# Patient Record
Sex: MALE | Race: WHITE | NOT HISPANIC OR LATINO | Employment: FULL TIME | URBAN - METROPOLITAN AREA
[De-identification: names, ages, dates, MRNs, and addresses within clinical notes are randomized per-mention and may not be internally consistent; named-entity substitution may affect disease eponyms.]

---

## 2017-01-28 DIAGNOSIS — R03.0 BORDERLINE HYPERTENSION: ICD-10-CM

## 2017-01-30 RX ORDER — LISINOPRIL 5 MG/1
TABLET ORAL
Qty: 30 TABLET | Refills: 0 | Status: SHIPPED | OUTPATIENT
Start: 2017-01-30 | End: 2017-03-11 | Stop reason: SDUPTHER

## 2017-02-16 ENCOUNTER — OFFICE VISIT (OUTPATIENT)
Dept: FAMILY MEDICINE CLINIC | Facility: CLINIC | Age: 52
End: 2017-02-16

## 2017-02-16 VITALS
DIASTOLIC BLOOD PRESSURE: 74 MMHG | HEART RATE: 88 BPM | WEIGHT: 256 LBS | HEIGHT: 72 IN | OXYGEN SATURATION: 96 % | SYSTOLIC BLOOD PRESSURE: 146 MMHG | TEMPERATURE: 98.4 F | BODY MASS INDEX: 34.67 KG/M2 | RESPIRATION RATE: 16 BRPM

## 2017-02-16 DIAGNOSIS — R00.2 HEART PALPITATIONS: ICD-10-CM

## 2017-02-16 DIAGNOSIS — Z23 NEED FOR INFLUENZA VACCINATION: ICD-10-CM

## 2017-02-16 DIAGNOSIS — F41.9 ANXIETY: ICD-10-CM

## 2017-02-16 DIAGNOSIS — I10 ESSENTIAL HYPERTENSION: Primary | ICD-10-CM

## 2017-02-16 DIAGNOSIS — R53.82 CHRONIC FATIGUE: ICD-10-CM

## 2017-02-16 PROCEDURE — 90658 IIV3 VACCINE SPLT 0.5 ML IM: CPT | Performed by: PHYSICIAN ASSISTANT

## 2017-02-16 PROCEDURE — 93000 ELECTROCARDIOGRAM COMPLETE: CPT | Performed by: PHYSICIAN ASSISTANT

## 2017-02-16 PROCEDURE — 90471 IMMUNIZATION ADMIN: CPT | Performed by: PHYSICIAN ASSISTANT

## 2017-02-16 PROCEDURE — 99214 OFFICE O/P EST MOD 30 MIN: CPT | Performed by: PHYSICIAN ASSISTANT

## 2017-02-16 RX ORDER — CITALOPRAM 40 MG/1
40 TABLET ORAL DAILY
Qty: 30 TABLET | Refills: 3 | Status: SHIPPED | OUTPATIENT
Start: 2017-02-16 | End: 2017-05-15 | Stop reason: SDUPTHER

## 2017-02-16 NOTE — PROGRESS NOTES
Subjective   Wei Foster is a 51 y.o. male.   Chief Complaint   Patient presents with   • Hypertension   • Follow-up       History of Present Illness     Wei is a 51-year-old male who presents with hypertension management.  He has gained 2 pounds since June 22, 2016 office visit.  States he has been having some heart palpitations and  increase anxiety.  His workload has increased.  He works about 53 hours a week and has more responsibility with less workers.  He has been compliant with his citalopram but feels anxious.  He has noticed some heart palpitations and lightheadedness at times.  Wei has been compliant with his blood pressure medication.  Not for sure if his blood pressure has been elevated or not.  Wei has not been checking his blood pressure at home.  Denied any chest pain, shortness of air, or swelling of his ankles.  He has had an occasional headache.  His sleep has been slightly restless due to normal.  He has been taking over-the-counter melatonin to help him sleep.  Denied any suicidal or homicidal ideation.  No witness sleep apnea or snoring noted.  He drinks approximately one pot of coffee a day.  He is been trying to exercise at the [employer] couple times a week.  He has also been walking at work for about 20 minutes a day.  His appetite has been slightly healthy.  He has cut out soda.  Denied any vision changes. Wei had a normal stress test last summer.  He is non fasting at today's office visit.  Wei has had some fatigue issues.  He would like to have the flu shot at today's office visit.      The following portions of the patient's history were reviewed and updated as appropriate: allergies, current medications, past family history, past medical history, past social history and past surgical history.    Review of Systems   Constitutional: Positive for fatigue. Negative for chills and fever.   Eyes: Negative.    Respiratory: Negative.  Negative for cough, shortness of breath and wheezing.   "  Cardiovascular: Positive for palpitations. Negative for chest pain.   Gastrointestinal: Negative.  Negative for constipation, diarrhea, nausea and vomiting.   Endocrine: Negative.    Genitourinary: Negative.    Musculoskeletal: Negative.    Skin: Negative.    Allergic/Immunologic: Negative.    Neurological: Positive for headaches.   Hematological: Negative.    Psychiatric/Behavioral: Negative for sleep disturbance and suicidal ideas. The patient is nervous/anxious.    All other systems reviewed and are negative.    Vitals:    02/16/17 0802   BP: 146/74   BP Location: Right arm   Patient Position: Sitting   Cuff Size: Adult   Pulse: 88   Resp: 16   Temp: 98.4 °F (36.9 °C)   TempSrc: Oral   SpO2: 96%   Weight: 256 lb (116 kg)   Height: 72\" (182.9 cm)     BP Readings from Last 3 Encounters:   02/16/17 146/74   06/22/16 137/64   05/11/16 120/90     Wt Readings from Last 3 Encounters:   02/16/17 256 lb (116 kg)   06/22/16 254 lb (115 kg)   05/11/16 253 lb 9.6 oz (115 kg)       Body mass index is 34.72 kg/(m^2).    Allergies   Allergen Reactions   • Penicillins        Objective   Physical Exam   Constitutional: He is oriented to person, place, and time. Vital signs are normal. He appears well-developed and well-nourished.   HENT:   Head: Normocephalic and atraumatic.   Neck: Trachea normal and phonation normal. Neck supple. Carotid bruit is not present. No edema present. No thyroid mass and no thyromegaly present.   Cardiovascular: Normal rate, regular rhythm, S1 normal, S2 normal, normal heart sounds, intact distal pulses and normal pulses.    Pulmonary/Chest: Effort normal and breath sounds normal.   Abdominal: Soft. Normal appearance, normal aorta and bowel sounds are normal. There is no hepatomegaly. There is no tenderness.   Neurological: He is alert and oriented to person, place, and time. He has normal reflexes.   Skin: Skin is warm, dry and intact.   Psychiatric: He has a normal mood and affect. His speech is " normal and behavior is normal. Judgment and thought content normal. Cognition and memory are normal.         ECG 12 Lead  Date/Time: 2/16/2017 8:29 AM  Performed by: CARLOS LOZA  Authorized by: CARLOS LOZA   Comparison: compared with previous ECG   Comparison to previous ECG: Similar to EKG performed on May 11, 2016  Rhythm: sinus rhythm  Rate: normal  BPM: 77  Conduction: conduction normal  ST Segments: ST segments normal  T Waves: T waves normal  QRS axis: normal  Clinical impression: normal ECG  Comments: Indication:  Hypertension and heart Palpitations  P/RI:  116/156 ms  QRS:  80 ms  QTc:  412/466 ms            Assessment/Plan   Wei was seen today for hypertension and follow-up.    Diagnoses and all orders for this visit:    Essential hypertension  -     ECG 12 Lead  -     CBC & Differential  -     Comprehensive Metabolic Panel  -     Lipid Panel With LDL / HDL Ratio    Anxiety  -     CBC & Differential  -     citalopram (CeleXA) 40 MG tablet; Take 1 tablet by mouth Daily.    Heart palpitations  -     ECG 12 Lead  -     CBC & Differential  -     Thyroid Panel With TSH    Need for influenza vaccination  -     Flu Vaccine Greater Than or Equal To 2yo With Preservative IM    Chronic fatigue  -     Vitamin B12  -     Vitamin D 25 Hydroxy  -     Thyroid Panel With TSH      1. Stable hypertension with heart Palpitations: I have rechecked his blood pressure at today's office visit and got 100/62 in left arm and 100/70 in left arm.  Refilled his blood pressure medication is stable at this time.  I've asked him to return to office in one month for reevaluation.  I suspect his symptoms may be related to increased anxiety.  I have asked him to decrease his caffeine intake.  This may be causing some of the palpitations.  He had a normal EKG at today's office visit.  We will consider possible Holter monitor in future if lab work is normal.  Wei will have a CMP and Lipid profile collected at today's office  visit.  He will be notified of results when completed.  2.  Chronic anxiety: I suspect his symptoms are related to increased anxiety at work.  I will increase his citalopram to 40 mg a day.  I have asked Wei to return to office in one month for reevaluation.  3.  Chronic fatigue: Wei will have a CBC, CMP, vitamin B12, vitamin D level and a thyroid profile with TSH collected at today's office visit for further evaluation of his symptoms.  He'll be notified of test results when completed.  4.  Need for influenza vaccination: Wei has given written consent and will receive the flu immunization at today's office visit.      Dragon transcription disclaimer    Much of this encounter note is an electronic transcription/translation of spoken language to printed text.  The electronic translation of spoken language may permit erroneous, or at times, nonsensical words or phrases to be inadvertently transcribed.  Although I have reviewed the note for such errors, some may still exist.    Stephanie Martines PA-C  Family Practice

## 2017-02-17 ENCOUNTER — TELEPHONE (OUTPATIENT)
Dept: FAMILY MEDICINE CLINIC | Facility: CLINIC | Age: 52
End: 2017-02-17

## 2017-02-17 LAB
25(OH)D3+25(OH)D2 SERPL-MCNC: 20.2 NG/ML
ALBUMIN SERPL-MCNC: 4.4 G/DL (ref 3.5–5.2)
ALBUMIN/GLOB SERPL: 2.3 G/DL
ALP SERPL-CCNC: 70 U/L (ref 40–129)
ALT SERPL-CCNC: 64 U/L (ref 5–41)
AST SERPL-CCNC: 34 U/L (ref 5–40)
BASOPHILS # BLD AUTO: 0.06 10*3/MM3 (ref 0–0.2)
BASOPHILS NFR BLD AUTO: 0.9 % (ref 0–2)
BILIRUB SERPL-MCNC: 0.3 MG/DL (ref 0.2–1.2)
BUN SERPL-MCNC: 15 MG/DL (ref 6–20)
BUN/CREAT SERPL: 18.3 (ref 7–25)
CALCIUM SERPL-MCNC: 9 MG/DL (ref 8.6–10.5)
CHLORIDE SERPL-SCNC: 104 MMOL/L (ref 98–107)
CHOLEST SERPL-MCNC: 223 MG/DL (ref 0–200)
CO2 SERPL-SCNC: 25.3 MMOL/L (ref 22–29)
CREAT SERPL-MCNC: 0.82 MG/DL (ref 0.76–1.27)
EOSINOPHIL # BLD AUTO: 0.09 10*3/MM3 (ref 0.1–0.3)
EOSINOPHIL NFR BLD AUTO: 1.4 % (ref 0–4)
ERYTHROCYTE [DISTWIDTH] IN BLOOD BY AUTOMATED COUNT: 13.1 % (ref 11.5–14.5)
FT4I SERPL CALC-MCNC: 1.8 (ref 1.2–4.9)
GLOBULIN SER CALC-MCNC: 1.9 GM/DL
GLUCOSE SERPL-MCNC: 97 MG/DL (ref 65–99)
HCT VFR BLD AUTO: 45.7 % (ref 42–52)
HDLC SERPL-MCNC: 43 MG/DL (ref 40–60)
HGB BLD-MCNC: 14.8 G/DL (ref 14–18)
IMM GRANULOCYTES # BLD: 0.03 10*3/MM3 (ref 0–0.03)
IMM GRANULOCYTES NFR BLD: 0.5 % (ref 0–0.5)
LDLC SERPL CALC-MCNC: 151 MG/DL (ref 0–100)
LDLC/HDLC SERPL: 3.51 {RATIO}
LYMPHOCYTES # BLD AUTO: 1.45 10*3/MM3 (ref 0.6–4.8)
LYMPHOCYTES NFR BLD AUTO: 22.3 % (ref 20–45)
MCH RBC QN AUTO: 29.7 PG (ref 27–31)
MCHC RBC AUTO-ENTMCNC: 32.4 G/DL (ref 31–37)
MCV RBC AUTO: 91.8 FL (ref 80–94)
MONOCYTES # BLD AUTO: 0.57 10*3/MM3 (ref 0–1)
MONOCYTES NFR BLD AUTO: 8.8 % (ref 3–8)
NEUTROPHILS # BLD AUTO: 4.29 10*3/MM3 (ref 1.5–8.3)
NEUTROPHILS NFR BLD AUTO: 66.1 % (ref 45–70)
NRBC BLD AUTO-RTO: 0 /100 WBC (ref 0–0)
PLATELET # BLD AUTO: 221 10*3/MM3 (ref 140–500)
POTASSIUM SERPL-SCNC: 4.2 MMOL/L (ref 3.5–5.2)
PROT SERPL-MCNC: 6.3 G/DL (ref 6–8.5)
RBC # BLD AUTO: 4.98 10*6/MM3 (ref 4.7–6.1)
SODIUM SERPL-SCNC: 142 MMOL/L (ref 136–145)
T3RU NFR SERPL: 28 % (ref 24–39)
T4 SERPL-MCNC: 6.5 UG/DL (ref 4.5–12)
TRIGL SERPL-MCNC: 146 MG/DL (ref 0–150)
TSH SERPL DL<=0.005 MIU/L-ACNC: 1.98 UIU/ML (ref 0.45–4.5)
VIT B12 SERPL-MCNC: 338 PG/ML (ref 211–946)
VLDLC SERPL CALC-MCNC: 29.2 MG/DL (ref 8–32)
WBC # BLD AUTO: 6.49 10*3/MM3 (ref 4.8–10.8)

## 2017-02-17 NOTE — TELEPHONE ENCOUNTER
----- Message from Stephanie Martines PA-C sent at 2/17/2017  7:06 AM EST -----  1.  Please notify patient vitamin B-12 was normal.  2.  CBC was normal.  He is not anemic.  3.  Fasting blood sugar was normal at 97.  4.  Liver function test have improved from 9 months ago.  5.  Cholesterol was 223, triglycerides 146, HDL 43 and LDL was 151.  His cholesterol readings were slightly elevated.  Please decrease fatty food intake and increase physical activity.  Please start over-the-counter omega-3 fatty acid 2 tablets a day.  Plan to repeat CMP and a lipid profile in 6 months.

## 2017-03-11 DIAGNOSIS — R03.0 BORDERLINE HYPERTENSION: ICD-10-CM

## 2017-03-13 RX ORDER — LISINOPRIL 5 MG/1
TABLET ORAL
Qty: 30 TABLET | Refills: 0 | Status: SHIPPED | OUTPATIENT
Start: 2017-03-13 | End: 2017-04-21 | Stop reason: SDUPTHER

## 2017-04-21 DIAGNOSIS — R03.0 BORDERLINE HYPERTENSION: ICD-10-CM

## 2017-04-21 RX ORDER — LISINOPRIL 5 MG/1
TABLET ORAL
Qty: 30 TABLET | Refills: 0 | Status: SHIPPED | OUTPATIENT
Start: 2017-04-21 | End: 2017-06-12 | Stop reason: SDUPTHER

## 2017-05-15 DIAGNOSIS — F41.9 ANXIETY: ICD-10-CM

## 2017-05-15 RX ORDER — CITALOPRAM 40 MG/1
40 TABLET ORAL DAILY
Qty: 30 TABLET | Refills: 3 | Status: SHIPPED | OUTPATIENT
Start: 2017-05-15 | End: 2017-05-25 | Stop reason: SDUPTHER

## 2017-05-25 ENCOUNTER — OFFICE VISIT (OUTPATIENT)
Dept: FAMILY MEDICINE CLINIC | Facility: CLINIC | Age: 52
End: 2017-05-25

## 2017-05-25 VITALS
RESPIRATION RATE: 20 BRPM | HEIGHT: 72 IN | DIASTOLIC BLOOD PRESSURE: 68 MMHG | HEART RATE: 91 BPM | OXYGEN SATURATION: 96 % | BODY MASS INDEX: 35.35 KG/M2 | SYSTOLIC BLOOD PRESSURE: 132 MMHG | WEIGHT: 261 LBS

## 2017-05-25 DIAGNOSIS — F41.9 ANXIETY: ICD-10-CM

## 2017-05-25 DIAGNOSIS — R05.9 COUGH: Primary | ICD-10-CM

## 2017-05-25 PROCEDURE — 99213 OFFICE O/P EST LOW 20 MIN: CPT | Performed by: NURSE PRACTITIONER

## 2017-05-25 RX ORDER — BENZONATATE 100 MG/1
200 CAPSULE ORAL 3 TIMES DAILY PRN
Qty: 60 CAPSULE | Refills: 0 | Status: SHIPPED | OUTPATIENT
Start: 2017-05-25 | End: 2017-12-04

## 2017-05-25 RX ORDER — CITALOPRAM 40 MG/1
40 TABLET ORAL DAILY
Qty: 30 TABLET | Refills: 3 | Status: SHIPPED | OUTPATIENT
Start: 2017-05-25 | End: 2018-06-07 | Stop reason: SDUPTHER

## 2017-06-12 DIAGNOSIS — R03.0 BORDERLINE HYPERTENSION: ICD-10-CM

## 2017-06-12 RX ORDER — LISINOPRIL 5 MG/1
TABLET ORAL
Qty: 30 TABLET | Refills: 0 | Status: SHIPPED | OUTPATIENT
Start: 2017-06-12 | End: 2017-07-28 | Stop reason: SINTOL

## 2017-07-28 ENCOUNTER — OFFICE VISIT (OUTPATIENT)
Dept: FAMILY MEDICINE CLINIC | Facility: CLINIC | Age: 52
End: 2017-07-28

## 2017-07-28 ENCOUNTER — HOSPITAL ENCOUNTER (OUTPATIENT)
Dept: GENERAL RADIOLOGY | Facility: HOSPITAL | Age: 52
Discharge: HOME | End: 2017-07-28
Admitting: PHYSICIAN ASSISTANT

## 2017-07-28 VITALS
HEART RATE: 83 BPM | TEMPERATURE: 98.2 F | WEIGHT: 257 LBS | DIASTOLIC BLOOD PRESSURE: 80 MMHG | HEIGHT: 72 IN | RESPIRATION RATE: 16 BRPM | SYSTOLIC BLOOD PRESSURE: 130 MMHG | BODY MASS INDEX: 34.81 KG/M2 | OXYGEN SATURATION: 98 %

## 2017-07-28 DIAGNOSIS — R06.2 WHEEZING: ICD-10-CM

## 2017-07-28 DIAGNOSIS — I10 ESSENTIAL HYPERTENSION: ICD-10-CM

## 2017-07-28 DIAGNOSIS — R05.9 COUGH: ICD-10-CM

## 2017-07-28 DIAGNOSIS — I10 ESSENTIAL HYPERTENSION: Primary | ICD-10-CM

## 2017-07-28 PROCEDURE — 71020 HC CHEST PA AND LATERAL: CPT

## 2017-07-28 PROCEDURE — 99213 OFFICE O/P EST LOW 20 MIN: CPT | Performed by: PHYSICIAN ASSISTANT

## 2017-07-28 RX ORDER — VALSARTAN 80 MG/1
80 TABLET ORAL DAILY
Qty: 30 TABLET | Refills: 3 | Status: SHIPPED | OUTPATIENT
Start: 2017-07-28 | End: 2017-12-04

## 2017-07-28 NOTE — PROGRESS NOTES
Subjective   Wei Foster is a 52 y.o. male.   Chief Complaint   Patient presents with   • Cough       History of Present Illness     Wei is a 52 year old male who presents with a dry cough for the past months.  He has been on Lisinopril for several years without coughing.  He has had some chest congestion,wheezing, heartburn,reflux and slight shortness of air with coughing.  He was treated for allergies in May but had no improvement with the cough.  Appetite and sleep has been normal.  Wei has been exercising regularly without cough/wheezing/SOA.  He denied any chest pain, fevers, chills, upper respiratory symptoms or swelling of ankles.    The following portions of the patient's history were reviewed and updated as appropriate: allergies, current medications, past family history, past medical history, past social history and past surgical history.    Review of Systems   Constitutional: Negative.    HENT: Negative.    Eyes: Negative.    Respiratory: Positive for cough and shortness of breath.    Cardiovascular: Negative.  Negative for palpitations and leg swelling.   Gastrointestinal: Negative.    Endocrine: Negative.    Genitourinary: Negative.    Musculoskeletal: Negative.    Skin: Negative.    Allergic/Immunologic: Negative.    Neurological: Negative.  Negative for dizziness, light-headedness and headaches.   Hematological: Negative.    Psychiatric/Behavioral: Negative.    All other systems reviewed and are negative.    Vitals:    07/28/17 1404   BP: 130/80   Pulse:    Resp:    Temp:    SpO2:        Wt Readings from Last 3 Encounters:   07/28/17 257 lb (117 kg)   05/25/17 261 lb (118 kg)   02/16/17 256 lb (116 kg)     SpO2 Readings from Last 3 Encounters:   07/28/17 98%   05/25/17 96%   02/16/17 96%     BP Readings from Last 3 Encounters:   07/28/17 130/80   05/25/17 132/68   02/16/17 146/74     Body mass index is 34.86 kg/(m^2).  Allergies   Allergen Reactions   • Penicillins        Objective   Physical Exam    Constitutional: He is oriented to person, place, and time. Vital signs are normal. He appears well-developed and well-nourished.   HENT:   Head: Normocephalic and atraumatic.   Right Ear: Hearing, tympanic membrane, external ear and ear canal normal.   Left Ear: Hearing, tympanic membrane, external ear and ear canal normal.   Nose: Nose normal. Right sinus exhibits no maxillary sinus tenderness and no frontal sinus tenderness. Left sinus exhibits no maxillary sinus tenderness and no frontal sinus tenderness.   Mouth/Throat: Uvula is midline, oropharynx is clear and moist and mucous membranes are normal.   Eyes: Conjunctivae and lids are normal.   Neck: Trachea normal and phonation normal. Neck supple. Carotid bruit is not present. Edema present. No thyroid mass and no thyromegaly present.   Cardiovascular: Normal rate, regular rhythm, S1 normal, S2 normal, normal heart sounds, intact distal pulses and normal pulses.    Pulmonary/Chest: Effort normal and breath sounds normal.   Abdominal: Soft. Normal appearance, normal aorta and bowel sounds are normal. There is no hepatomegaly. There is no tenderness.   Lymphadenopathy:     He has no cervical adenopathy.   Neurological: He is alert and oriented to person, place, and time. He has normal reflexes.   Skin: Skin is warm, dry and intact.   Psychiatric: He has a normal mood and affect. His speech is normal and behavior is normal. Judgment and thought content normal. Cognition and memory are normal.       Assessment/Plan   Wei was seen today for cough.    Diagnoses and all orders for this visit:    Essential hypertension  -     XR Chest PA & Lateral; Future    Cough  -     XR Chest PA & Lateral; Future    Wheezing  -     XR Chest PA & Lateral; Future      1. Stable Hypertension: I have rechecked his blood pressure at today's office visit and got 130/78.  He has been taken as well as lisinopril for several years without difficulty.  If his chest x-rays normal will  consider stopping the lisinopril to see if his cough improves.  We'll consider possible alternative medications if warranted.  2.  New cough and wheezing: Wei has been having coughing and wheezing off and on for the past 6 weeks.  He was treated for a viral infection in May without improvement of his symptoms.  He will have a chest x-ray at the [facility] today for further evaluation of his symptoms.  I'll consider stopping the lisinopril and trying alternative medications if normal.        Dragon transcription disclaimer    Much of this encounter note is an electronic transcription/translation of spoken language to printed text.  The electronic translation of spoken language may permit erroneous, or at times, nonsensical words or phrases to be inadvertently transcribed.  Although I have reviewed the note for such errors, some may still exist.    Stephanie Martines PA-C  Family Practice

## 2017-07-31 ENCOUNTER — TELEPHONE (OUTPATIENT)
Dept: FAMILY MEDICINE CLINIC | Facility: CLINIC | Age: 52
End: 2017-07-31

## 2017-07-31 NOTE — TELEPHONE ENCOUNTER
----- Message from Stephanie Martines PA-C sent at 7/28/2017  4:46 PM EDT -----  Notify patient that chest x-ray was normal.  Plan to stop the lisinopril medication.  I will sent to pharmacy generic Diovan in its place.  Please take this once a day.  He should have resolution of cough if it's related to the lisinopril medication.  If not we'll send to pulmonology.  After starting new blood pressure medication please stop by office in 2 weeks for blood pressure check.

## 2017-10-20 DIAGNOSIS — E78.00 HYPERCHOLESTEREMIA: Primary | ICD-10-CM

## 2017-11-02 LAB
ALBUMIN SERPL-MCNC: 4.5 G/DL (ref 3.5–5.2)
ALBUMIN/GLOB SERPL: 1.9 G/DL
ALP SERPL-CCNC: 78 U/L (ref 40–129)
ALT SERPL-CCNC: 142 U/L (ref 5–41)
AST SERPL-CCNC: 66 U/L (ref 5–40)
BILIRUB SERPL-MCNC: 0.5 MG/DL (ref 0.2–1.2)
BUN SERPL-MCNC: 14 MG/DL (ref 6–20)
BUN/CREAT SERPL: 16.3 (ref 7–25)
CALCIUM SERPL-MCNC: 9.2 MG/DL (ref 8.6–10.5)
CHLORIDE SERPL-SCNC: 102 MMOL/L (ref 98–107)
CHOLEST SERPL-MCNC: 219 MG/DL (ref 0–200)
CO2 SERPL-SCNC: 28.3 MMOL/L (ref 22–29)
CREAT SERPL-MCNC: 0.86 MG/DL (ref 0.76–1.27)
GFR SERPLBLD CREATININE-BSD FMLA CKD-EPI: 114 ML/MIN/1.73
GFR SERPLBLD CREATININE-BSD FMLA CKD-EPI: 94 ML/MIN/1.73
GLOBULIN SER CALC-MCNC: 2.4 GM/DL
GLUCOSE SERPL-MCNC: 93 MG/DL (ref 65–99)
HDLC SERPL-MCNC: 38 MG/DL (ref 40–60)
LDLC SERPL CALC-MCNC: 147 MG/DL (ref 0–100)
LDLC/HDLC SERPL: 3.87 {RATIO}
POTASSIUM SERPL-SCNC: 4.1 MMOL/L (ref 3.5–5.2)
PROT SERPL-MCNC: 6.9 G/DL (ref 6–8.5)
SODIUM SERPL-SCNC: 142 MMOL/L (ref 136–145)
TRIGL SERPL-MCNC: 170 MG/DL (ref 0–150)
VLDLC SERPL CALC-MCNC: 34 MG/DL (ref 8–32)

## 2017-11-15 LAB
GGT SERPL-CCNC: 62 U/L (ref 8–61)
HCV AB S/CO SERPL IA: <0.1 S/CO RATIO (ref 0–0.9)
WRITTEN AUTHORIZATION: NORMAL

## 2017-11-16 ENCOUNTER — TELEPHONE (OUTPATIENT)
Dept: FAMILY MEDICINE CLINIC | Facility: CLINIC | Age: 52
End: 2017-11-16

## 2017-11-16 NOTE — TELEPHONE ENCOUNTER
----- Message from Stephanie Martines PA-C sent at 11/15/2017 11:36 AM EST -----  Have patient schedule an appointment to discuss lab work.

## 2017-12-04 ENCOUNTER — OFFICE VISIT (OUTPATIENT)
Dept: FAMILY MEDICINE CLINIC | Facility: CLINIC | Age: 52
End: 2017-12-04

## 2017-12-04 VITALS
RESPIRATION RATE: 16 BRPM | TEMPERATURE: 98.4 F | OXYGEN SATURATION: 95 % | HEART RATE: 88 BPM | BODY MASS INDEX: 35.21 KG/M2 | WEIGHT: 260 LBS | DIASTOLIC BLOOD PRESSURE: 92 MMHG | HEIGHT: 72 IN | SYSTOLIC BLOOD PRESSURE: 138 MMHG

## 2017-12-04 DIAGNOSIS — R94.5 ABNORMAL LIVER FUNCTION: ICD-10-CM

## 2017-12-04 DIAGNOSIS — E78.2 MIXED HYPERLIPIDEMIA: ICD-10-CM

## 2017-12-04 DIAGNOSIS — I10 ESSENTIAL HYPERTENSION: Primary | ICD-10-CM

## 2017-12-04 DIAGNOSIS — F41.9 ANXIETY: ICD-10-CM

## 2017-12-04 DIAGNOSIS — IMO0001 COLD: ICD-10-CM

## 2017-12-04 PROCEDURE — 99214 OFFICE O/P EST MOD 30 MIN: CPT | Performed by: PHYSICIAN ASSISTANT

## 2017-12-04 RX ORDER — LISINOPRIL 40 MG/1
40 TABLET ORAL DAILY
COMMUNITY
End: 2017-12-04 | Stop reason: SDUPTHER

## 2017-12-04 RX ORDER — BENZONATATE 100 MG/1
100 CAPSULE ORAL 3 TIMES DAILY PRN
Qty: 30 CAPSULE | Refills: 0 | Status: SHIPPED | OUTPATIENT
Start: 2017-12-04

## 2017-12-04 RX ORDER — LISINOPRIL 40 MG/1
40 TABLET ORAL DAILY
Qty: 30 TABLET | Refills: 6 | Status: SHIPPED | OUTPATIENT
Start: 2017-12-04 | End: 2018-07-23 | Stop reason: RX

## 2017-12-04 NOTE — PROGRESS NOTES
Subjective   Wei Foster is a 52 y.o. male.   Chief Complaint   Patient presents with   • Hypertension     management   • Cough       History of Present Illness     Wei is a 52-year-old male who presents for hypertension management.  He has gained 3 pounds since July 28, 2017.  He is also here to discuss his lab work. He just with a head cold that started last night.  He was working in his yard over the weekend. He has had some sinus pressure,dry cough,stuffy nose and one episode of diarrhea today.  Wei took some OTC naproxen which has helped.  He didn't go to work today.  Wei denied any foreign travel, tattoos, piercing,blood transfusion or IV drug usage.  He has city water.  Bowel movements have been daily without dark black or wei colored stools.  Denied any chest pain, shortness of air, dizziness, night sweats, melena pain.  He has Sent home that are well.  No family members are sick either.  Sleep and appetite have been normal.    The following portions of the patient's history were reviewed and updated as appropriate: allergies, current medications, past family history, past medical history, past social history and past surgical history.    Review of Systems   Constitutional: Negative.  Negative for chills, fatigue and fever.   HENT: Positive for sinus pressure. Negative for congestion, ear pain, postnasal drip, rhinorrhea, sinus pain, sore throat and voice change.    Eyes: Negative.    Respiratory: Positive for cough. Negative for shortness of breath and wheezing.    Cardiovascular: Negative.  Negative for chest pain, palpitations and leg swelling.   Gastrointestinal: Positive for diarrhea (once). Negative for abdominal pain, constipation and vomiting.   Endocrine: Negative.    Genitourinary: Negative.    Musculoskeletal: Negative.    Skin: Negative.    Allergic/Immunologic: Negative.    Neurological: Negative.  Negative for headaches.   Hematological: Negative.    Psychiatric/Behavioral: Negative.   "Negative for sleep disturbance and suicidal ideas.   All other systems reviewed and are negative.    Vitals:    12/04/17 1601   BP: 138/92   BP Location: Left arm   Patient Position: Sitting   Cuff Size: Large Adult   Pulse: 88   Resp: 16   Temp: 98.4 °F (36.9 °C)   TempSrc: Oral   SpO2: 95%   Weight: 260 lb (118 kg)   Height: 72\" (182.9 cm)       Wt Readings from Last 3 Encounters:   12/04/17 260 lb (118 kg)   07/28/17 257 lb (117 kg)   05/25/17 261 lb (118 kg)       BP Readings from Last 3 Encounters:   12/04/17 138/92   07/28/17 130/80   05/25/17 132/68     Body mass index is 35.26 kg/(m^2).  Allergies   Allergen Reactions   • Penicillins        Objective   Physical Exam   Constitutional: He is oriented to person, place, and time. Vital signs are normal. He appears well-developed and well-nourished.   HENT:   Head: Normocephalic and atraumatic.   Right Ear: Hearing, tympanic membrane, external ear and ear canal normal.   Left Ear: Hearing, tympanic membrane, external ear and ear canal normal.   Nose: Nose normal. Right sinus exhibits no maxillary sinus tenderness and no frontal sinus tenderness. Left sinus exhibits no maxillary sinus tenderness and no frontal sinus tenderness.   Mouth/Throat: Uvula is midline and mucous membranes are normal.   Slight post nasal drip   Eyes: Conjunctivae, EOM and lids are normal. Pupils are equal, round, and reactive to light. No scleral icterus.   Neck: Trachea normal and phonation normal. Neck supple. Carotid bruit is not present. No edema present. No thyromegaly present.   Cardiovascular: Normal rate, regular rhythm, S1 normal, S2 normal, normal heart sounds, intact distal pulses and normal pulses.    Pulmonary/Chest: Effort normal and breath sounds normal.   Abdominal: Soft. Normal appearance, normal aorta and bowel sounds are normal. There is no hepatosplenomegaly, splenomegaly or hepatomegaly. There is no tenderness.   Lymphadenopathy:     He has no cervical adenopathy. "   Neurological: He is alert and oriented to person, place, and time. He has normal reflexes.   Skin: Skin is warm, dry and intact.   Psychiatric: He has a normal mood and affect. His speech is normal and behavior is normal. Judgment and thought content normal. Cognition and memory are normal.       Assessment/Plan   Wei was seen today for hypertension and cough.    Diagnoses and all orders for this visit:    Essential hypertension  -     lisinopril (PRINIVIL,ZESTRIL) 40 MG tablet; Take 1 tablet by mouth Daily.    Abnormal liver function  -     Hepatic Function Panel    Cold  -     benzonatate (TESSALON) 100 MG capsule; Take 1 capsule by mouth 3 (Three) Times a Day As Needed for Cough.    Anxiety    Mixed hyperlipidemia      1. Chronic and Stable hypertension: I have rechecked Wei's blood pressure at today's office visit and got 1:30/90 in left arm.  He has not had his blood pressure medication today.  I've instructed Wei to take his blood pressure medicine when he gets home.  He will schedule follow up appointment in 6 months for reevaluation.  2.  New abnormal liver function test: Wei's ALT and GGT were elevated.  He will have repeat hepatic function tests blood work at today's office visit.  He'll be notified of test results when completed.  3.  New hyperlipidemia: I have reviewed Wei's blood work with him at today's office visit.  His cholesterol and triglycerides were slightly elevated.  Asked him to decrease his carbohydrates and fatty food in diet.  Have encouraged him to increase his physical activity as well.  Wei will start over-the-counter Fish oil 2 capsules daily.  We'll hold any statin medication for now due to his elevated liver function test.  Plan to repeat his lipid profile in 3 months.  4.  New cold: Wei has a common cold.  I have prescribed Tessalon Perles to pharmacy for his cough.  He will return to office if no improvement or worsening symptoms.  5.  Chronic and stable anxiety: Doing  well with the Celexa medication.  No refills are required at this time.          No visits with results within 2 Week(s) from this visit.  Latest known visit with results is:    Orders Only on 10/20/2017   Component Date Value Ref Range Status   • Glucose 11/02/2017 93  65 - 99 mg/dL Final   • BUN 11/02/2017 14  6 - 20 mg/dL Final   • Creatinine 11/02/2017 0.86  0.76 - 1.27 mg/dL Final   • eGFR Non  Am 11/02/2017 94  >60 mL/min/1.73 Final   • eGFR African Am 11/02/2017 114  >60 mL/min/1.73 Final   • BUN/Creatinine Ratio 11/02/2017 16.3  7.0 - 25.0 Final   • Sodium 11/02/2017 142  136 - 145 mmol/L Final   • Potassium 11/02/2017 4.1  3.5 - 5.2 mmol/L Final   • Chloride 11/02/2017 102  98 - 107 mmol/L Final   • Total CO2 11/02/2017 28.3  22.0 - 29.0 mmol/L Final   • Calcium 11/02/2017 9.2  8.6 - 10.5 mg/dL Final   • Total Protein 11/02/2017 6.9  6.0 - 8.5 g/dL Final   • Albumin 11/02/2017 4.50  3.50 - 5.20 g/dL Final   • Globulin 11/02/2017 2.4  gm/dL Final   • A/G Ratio 11/02/2017 1.9  g/dL Final   • Total Bilirubin 11/02/2017 0.5  0.2 - 1.2 mg/dL Final   • Alkaline Phosphatase 11/02/2017 78  40 - 129 U/L Final   • AST (SGOT) 11/02/2017 66* 5 - 40 U/L Final   • ALT (SGPT) 11/02/2017 142* 5 - 41 U/L Final   • Total Cholesterol 11/02/2017 219* 0 - 200 mg/dL Final   • Triglycerides 11/02/2017 170* 0 - 150 mg/dL Final   • HDL Cholesterol 11/02/2017 38* 40 - 60 mg/dL Final   • VLDL Cholesterol 11/02/2017 34* 8 - 32 mg/dL Final   • LDL Cholesterol  11/02/2017 147* 0 - 100 mg/dL Final   • LDL/HDL Ratio 11/02/2017 3.87   Final   • Hep C Virus Ab 11/02/2017 <0.1  0.0 - 0.9 s/co ratio Final    Comment:                                   Negative:     < 0.8                               Indeterminate: 0.8 - 0.9                                    Positive:     > 0.9   The CDC recommends that a positive HCV antibody result   be followed up with a HCV Nucleic Acid Amplification   test ([id number]).     • GGT 11/02/2017 62* 8 - 61  U/L Final   • Written Authorization 11/02/2017 Comment   Final    Comment: Written Authorization Received.  Authorization received from [employer] L[employer] 11-  Logged by Roma Katz transcription disclaimer    Much of this encounter note is an electronic transcription/translation of spoken language to printed text.  The electronic translation of spoken language may permit erroneous, or at times, nonsensical words or phrases to be inadvertently transcribed.  Although I have reviewed the note for such errors, some may still exist.    Stephanie Martines PA-C  Family Practice

## 2017-12-05 LAB
ALBUMIN SERPL-MCNC: 4.4 G/DL (ref 3.5–5.2)
ALP SERPL-CCNC: 76 U/L (ref 40–129)
ALT SERPL-CCNC: 143 U/L (ref 5–41)
AST SERPL-CCNC: 57 U/L (ref 5–40)
BILIRUB DIRECT SERPL-MCNC: <0.2 MG/DL (ref 0.2–0.3)
BILIRUB SERPL-MCNC: 0.2 MG/DL (ref 0.2–1.2)
PROT SERPL-MCNC: 6.7 G/DL (ref 6–8.5)

## 2017-12-07 ENCOUNTER — TELEPHONE (OUTPATIENT)
Dept: FAMILY MEDICINE CLINIC | Facility: CLINIC | Age: 52
End: 2017-12-07

## 2017-12-07 DIAGNOSIS — R79.89 ABNORMAL LIVER FUNCTION TEST: Primary | ICD-10-CM

## 2017-12-07 LAB
HAV IGM SERPL QL IA: NEGATIVE
HBV CORE IGM SERPL QL IA: NEGATIVE
HBV SURFACE AG SERPL QL IA: NEGATIVE
HCV AB S/CO SERPL IA: <0.1 S/CO RATIO (ref 0–0.9)
WRITTEN AUTHORIZATION: NORMAL

## 2017-12-07 NOTE — TELEPHONE ENCOUNTER
----- Message from Stephanie Martines PA-C sent at 12/7/2017  7:05 AM EST -----  Notify patient that his AST was 57 and ALT was 143.  This is still elevated.  His hepatitis A and hepatitis B testing were negative.  Notify patient that I have placed an order for ultrasound of liver for further evaluation of his persistent elevated liver function test.

## 2017-12-14 ENCOUNTER — HOSPITAL ENCOUNTER (OUTPATIENT)
Dept: ULTRASOUND IMAGING | Facility: HOSPITAL | Age: 52
Discharge: HOME | End: 2017-12-14
Admitting: PHYSICIAN ASSISTANT

## 2017-12-14 DIAGNOSIS — R79.89 ABNORMAL LIVER FUNCTION TEST: ICD-10-CM

## 2017-12-14 PROCEDURE — 76705 ECHO EXAM OF ABDOMEN: CPT

## 2017-12-15 ENCOUNTER — TELEPHONE (OUTPATIENT)
Dept: FAMILY MEDICINE CLINIC | Facility: CLINIC | Age: 52
End: 2017-12-15

## 2017-12-15 DIAGNOSIS — R94.5 ABNORMAL LIVER FUNCTION: Primary | ICD-10-CM

## 2017-12-15 DIAGNOSIS — K76.0 HEPATIC STEATOSIS: ICD-10-CM

## 2017-12-15 NOTE — TELEPHONE ENCOUNTER
----- Message from Stephanie Martines PA-C sent at 12/15/2017  6:55 AM EST -----  Please notify patient that his ultrasound of liver showed a fatty liver.  I will schedule a referral to GI specialist for further evaluation of his persistent elevated liver function tests with his fatty liver.

## 2017-12-19 ENCOUNTER — OFFICE VISIT (OUTPATIENT)
Dept: GASTROENTEROLOGY | Facility: CLINIC | Age: 52
End: 2017-12-19

## 2017-12-19 VITALS
DIASTOLIC BLOOD PRESSURE: 84 MMHG | TEMPERATURE: 98.2 F | SYSTOLIC BLOOD PRESSURE: 136 MMHG | HEIGHT: 72 IN | WEIGHT: 260.2 LBS | BODY MASS INDEX: 35.24 KG/M2

## 2017-12-19 DIAGNOSIS — K76.0 FATTY LIVER: Primary | ICD-10-CM

## 2017-12-19 PROCEDURE — 99203 OFFICE O/P NEW LOW 30 MIN: CPT | Performed by: INTERNAL MEDICINE

## 2017-12-19 RX ORDER — MULTIPLE VITAMINS W/ MINERALS TAB 9MG-400MCG
1 TAB ORAL DAILY
COMMUNITY

## 2017-12-19 NOTE — PROGRESS NOTES
Chief Complaint   Patient presents with   • ABN LFT's     Subjective      HPI     Wei Foster is a 52 y.o. male who presents for evaluation of elevated liver enzymes.  Elevation dates back to at least early last year. LFTs have been progressively increasing (normal TB and ALP).  Pt reports he is currently heaviest he has been.  He is working on dietary modification and has plans to increase exercise.  He reports insignificant EtOh consumption.  No family hx of liver diseases.  Prior workup included negative PHILL and acute hepatitis panel.  U/S shows hepatic steatosis.  He has elevated lipids but is not currently on therapy.  He denies OTC herbal supplements or ilicits.  He has not yet had screening colonoscopy - was scheduled last year but did not follow through with this.        Past Medical History:   Diagnosis Date   • Abnormal LFTs    • Anxiety    • Chronic lower back pain    • Hypertension        Current Outpatient Prescriptions:   •  cholecalciferol (VITAMIN D3) 400 UNITS tablet, Take 400 Units by mouth daily., Disp: , Rfl:   •  citalopram (CeleXA) 40 MG tablet, Take 1 tablet by mouth Daily., Disp: 30 tablet, Rfl: 3  •  lisinopril (PRINIVIL,ZESTRIL) 40 MG tablet, Take 1 tablet by mouth Daily., Disp: 30 tablet, Rfl: 6  •  Multiple Vitamins-Minerals (MULTIVITAMIN WITH MINERALS) tablet tablet, Take 1 tablet by mouth Daily., Disp: , Rfl:   •  naproxen (NAPROSYN) 500 MG tablet, Take 500 mg by mouth 2 (two) times a day with meals., Disp: , Rfl:   •  benzonatate (TESSALON) 100 MG capsule, Take 1 capsule by mouth 3 (Three) Times a Day As Needed for Cough., Disp: 30 capsule, Rfl: 0     Allergies   Allergen Reactions   • Penicillins      Social History     Social History   • Marital status:      Spouse name: N/A   • Number of children: N/A   • Years of education: N/A     Occupational History   • Not on file.     Social History Main Topics   • Smoking status: Never Smoker   • Smokeless tobacco: Never Used   •  Alcohol use Yes      Comment: 2-3 beers or 1 wine monthly   • Drug use: No   • Sexual activity: Not on file     Other Topics Concern   • Not on file     Social History Narrative     Family History   Problem Relation Age of Onset   • Rheum arthritis Mother    • Bipolar disorder Mother    • Glaucoma Mother    • Pancreatitis Mother    • Heart disease Father    • Hypertension Father    • Stroke Father    • Glaucoma Father    • Hypertension Brother      Review of Systems   Constitutional: Negative.    Respiratory: Negative.    Cardiovascular: Negative.    Gastrointestinal: Negative.    All other systems reviewed and are negative.    Objective   Vitals:    12/19/17 1413   BP: 136/84   Temp: 98.2 °F (36.8 °C)     Physical Exam   Constitutional: He is oriented to person, place, and time. He appears well-developed and well-nourished.   HENT:   Head: Normocephalic and atraumatic.   Mouth/Throat: Oropharynx is clear and moist.   Eyes: Conjunctivae are normal. No scleral icterus.   Neck: Normal range of motion. Neck supple. No thyromegaly present.   Cardiovascular: Normal rate and regular rhythm.  Exam reveals no friction rub.    No murmur heard.  Pulmonary/Chest: Effort normal and breath sounds normal. No respiratory distress. He has no wheezes. He has no rales. He exhibits no tenderness.   Abdominal: Soft. Bowel sounds are normal. He exhibits no distension and no mass. There is no tenderness. There is no rebound and no guarding. No hernia.   Obese  Diastasis rectus   Musculoskeletal: He exhibits no edema.   Lymphadenopathy:     He has no cervical adenopathy.     He has no axillary adenopathy.   Neurological: He is alert and oriented to person, place, and time.   Skin: Skin is warm and dry. No rash noted.   Psychiatric: He has a normal mood and affect. His behavior is normal. Judgment and thought content normal.   Vitals reviewed.       Assessment/Plan   Assessment:     1. Fatty liver      Plan:   Will arrange to complete  serologic workup today  Check CUEVAS Fibrosure    Long discussion with patient about importance of dietary modification and regular exercise with goal of weight reduction for treatment of his presumed CUEVAS.   He should be considered for lipid lower agent in the future if he does not have improvement in his LDL.  May consider trial of Vitamin E if advanced fibrosis suggested on Fibrosure.    We will discuss screening colonoscopy at next visit.  He had this scheduled last year with another provider but did not follow through.      Return in about 3 months (around 3/19/2018).          Umer Emery M.D.  [facility]  [address] [place] [address]  Office: [phone]

## 2017-12-22 LAB
A1AT SERPL-MCNC: 131 MG/DL (ref 90–200)
A2 MACROGLOB SERPL-MCNC: 119 MG/DL (ref 110–276)
ALT SERPL W P-5'-P-CCNC: 121 IU/L (ref 0–55)
APO A-I SERPL-MCNC: 128 MG/DL (ref 101–178)
AST SERPL W P-5'-P-CCNC: 44 IU/L (ref 0–40)
BILIRUB SERPL-MCNC: 0.3 MG/DL (ref 0–1.2)
CHOLEST SERPL-MCNC: 227 MG/DL (ref 100–199)
ENDOMYSIUM IGA SER QL: NEGATIVE
FERRITIN SERPL-MCNC: 149.1 NG/ML (ref 30–400)
FIBROSIS SCORING:: ABNORMAL
FIBROSIS STAGE SERPL QL: ABNORMAL
GGT SERPL-CCNC: 59 IU/L (ref 0–65)
GLIADIN PEPTIDE IGA SER-ACNC: 14 UNITS (ref 0–19)
GLIADIN PEPTIDE IGG SER-ACNC: 3 UNITS (ref 0–19)
GLUCOSE SERPL-MCNC: 71 MG/DL (ref 65–99)
HAPTOGLOB SERPL-MCNC: 81 MG/DL (ref 34–200)
IGA SERPL-MCNC: 279 MG/DL (ref 90–386)
IGG SERPL-MCNC: 717 MG/DL (ref 700–1600)
INTERPRETATIONS: (REFERENCE): ABNORMAL
IRON SATN MFR SERPL: 19 % (ref 20–50)
IRON SERPL-MCNC: 84 MCG/DL (ref 59–158)
LABORATORY COMMENT REPORT: ABNORMAL
LIVER FIBR SCORE SERPL CALC.FIBROSURE: 0.14 (ref 0–0.21)
MITOCHONDRIA M2 IGG SER-ACNC: <20 UNITS (ref 0–20)
NASH SCORING (REFERENCE): ABNORMAL
NECROINFLAMMATORY ACT GRADE SERPL QL: ABNORMAL
NECROINFLAMMATORY ACT SCORE SERPL: 0.5
SERVICE CMNT-IMP: ABNORMAL
STEATOSIS GRADE (REFERENCE): ABNORMAL
STEATOSIS GRADING (REFERENCE): ABNORMAL
STEATOSIS SCORE (REFERENCE): 0.84 (ref 0–0.3)
TIBC SERPL-MCNC: 437 MCG/DL
TRIGL SERPL-MCNC: 257 MG/DL (ref 0–149)
TTG IGA SER-ACNC: <2 U/ML (ref 0–3)
TTG IGG SER-ACNC: <2 U/ML (ref 0–5)
UIBC SERPL-MCNC: 353 MCG/DL

## 2018-01-11 ENCOUNTER — TELEPHONE (OUTPATIENT)
Dept: GASTROENTEROLOGY | Facility: CLINIC | Age: 53
End: 2018-01-11

## 2018-01-11 NOTE — TELEPHONE ENCOUNTER
----- Message from Umer Emery MD sent at 1/3/2018  4:29 PM EST -----  Fibro-sure shows lots of fat in the liver but minimal fibrosis which is reassuring  Other serology test were negative  Continue with lifestyle modification as discussed in the office    Follow-up in 3 months with CMP 1 week prior

## 2018-02-04 DIAGNOSIS — I10 ESSENTIAL HYPERTENSION: ICD-10-CM

## 2018-02-05 RX ORDER — VALSARTAN 80 MG/1
TABLET ORAL
Qty: 30 TABLET | Refills: 2 | Status: SHIPPED | OUTPATIENT
Start: 2018-02-05 | End: 2018-07-23 | Stop reason: RX

## 2018-06-07 DIAGNOSIS — F41.9 ANXIETY: ICD-10-CM

## 2018-06-07 RX ORDER — CITALOPRAM 40 MG/1
40 TABLET ORAL DAILY
Qty: 30 TABLET | Refills: 3 | Status: SHIPPED | OUTPATIENT
Start: 2018-06-07 | End: 2018-12-28 | Stop reason: SDUPTHER

## 2018-07-23 DIAGNOSIS — I10 ESSENTIAL HYPERTENSION: Primary | ICD-10-CM

## 2018-07-23 DIAGNOSIS — I10 ESSENTIAL HYPERTENSION: ICD-10-CM

## 2018-07-23 RX ORDER — VALSARTAN 80 MG/1
TABLET ORAL
Qty: 30 TABLET | Refills: 1 | OUTPATIENT
Start: 2018-07-23

## 2018-07-23 RX ORDER — LOSARTAN POTASSIUM 50 MG/1
50 TABLET ORAL DAILY
Qty: 30 TABLET | Refills: 2 | Status: SHIPPED | OUTPATIENT
Start: 2018-07-23 | End: 2018-12-28 | Stop reason: SDUPTHER

## 2018-07-23 NOTE — TELEPHONE ENCOUNTER
Notify patient that his valsartan medication has been recalled by the FDA.  I have sent in Cozaar to pharmacy.  Have him schedule an appointment in the next 4-6 weeks to recheck his blood pressure.

## 2018-07-23 NOTE — TELEPHONE ENCOUNTER
Patient notified Valsartan recalled and Cozaar called to pharmacy, he will schedule appt for 4-6 weeks.

## 2018-12-28 DIAGNOSIS — F41.9 ANXIETY: ICD-10-CM

## 2018-12-28 DIAGNOSIS — I10 ESSENTIAL HYPERTENSION: ICD-10-CM

## 2018-12-28 RX ORDER — LOSARTAN POTASSIUM 50 MG/1
TABLET ORAL
Qty: 30 TABLET | Refills: 1 | Status: SHIPPED | OUTPATIENT
Start: 2018-12-28 | End: 2019-01-30 | Stop reason: RX

## 2018-12-28 RX ORDER — CITALOPRAM 40 MG/1
TABLET ORAL
Qty: 30 TABLET | Refills: 2 | Status: SHIPPED | OUTPATIENT
Start: 2018-12-28 | End: 2019-07-14 | Stop reason: SDUPTHER

## 2019-01-30 ENCOUNTER — TELEPHONE (OUTPATIENT)
Dept: FAMILY MEDICINE CLINIC | Facility: CLINIC | Age: 54
End: 2019-01-30

## 2019-01-30 DIAGNOSIS — I10 ESSENTIAL HYPERTENSION: Primary | ICD-10-CM

## 2019-01-30 RX ORDER — IRBESARTAN 150 MG/1
150 TABLET ORAL NIGHTLY
Qty: 30 TABLET | Refills: 3 | Status: SHIPPED | OUTPATIENT
Start: 2019-01-30 | End: 2019-07-18 | Stop reason: SDUPTHER

## 2019-01-30 RX ORDER — OLMESARTAN MEDOXOMIL 20 MG/1
20 TABLET ORAL DAILY
Qty: 30 TABLET | Refills: 3 | Status: SHIPPED | OUTPATIENT
Start: 2019-01-30 | End: 2019-01-30

## 2019-01-30 NOTE — TELEPHONE ENCOUNTER
Notify patient I have sent generic Avapro into his pharmacy.  Have him schedule for blood pressure check in the next 3-4 weeks.

## 2019-07-14 DIAGNOSIS — F41.9 ANXIETY: ICD-10-CM

## 2019-07-15 RX ORDER — CITALOPRAM 40 MG/1
40 TABLET ORAL DAILY
Qty: 15 TABLET | Refills: 0 | Status: SHIPPED | OUTPATIENT
Start: 2019-07-15 | End: 2019-07-17

## 2019-07-17 ENCOUNTER — TELEPHONE (OUTPATIENT)
Dept: FAMILY MEDICINE CLINIC | Facility: CLINIC | Age: 54
End: 2019-07-17

## 2019-07-17 DIAGNOSIS — F41.9 ANXIETY: ICD-10-CM

## 2019-07-17 RX ORDER — CITALOPRAM 40 MG/1
40 TABLET ORAL DAILY
Qty: 30 TABLET | Refills: 0 | Status: SHIPPED | OUTPATIENT
Start: 2019-07-17 | End: 2019-09-16 | Stop reason: SDUPTHER

## 2019-07-17 NOTE — TELEPHONE ENCOUNTER
Please notify patient that I have sent in the citalopram for 30 days.  He was last seen in our office in 2017.  We must see him at least twice a year due to his blood pressure and medication usage.  Very important that he gets and as soon as he can to be evaluated.

## 2019-07-17 NOTE — TELEPHONE ENCOUNTER
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    Pt requests we postpone his followup until he can come in probably in September. Please advise.

## 2019-07-18 DIAGNOSIS — I10 ESSENTIAL HYPERTENSION: ICD-10-CM

## 2019-07-18 RX ORDER — IRBESARTAN 150 MG/1
150 TABLET ORAL NIGHTLY
Qty: 30 TABLET | Refills: 0 | Status: SHIPPED | OUTPATIENT
Start: 2019-07-18 | End: 2019-12-26

## 2019-09-16 DIAGNOSIS — F41.9 ANXIETY: ICD-10-CM

## 2019-09-16 RX ORDER — CITALOPRAM 40 MG/1
40 TABLET ORAL DAILY
Qty: 14 TABLET | Refills: 0 | Status: SHIPPED | OUTPATIENT
Start: 2019-09-16

## 2019-10-04 DIAGNOSIS — F41.9 ANXIETY: ICD-10-CM

## 2019-10-04 RX ORDER — CITALOPRAM 40 MG/1
TABLET ORAL
Qty: 14 TABLET | Refills: 0 | OUTPATIENT
Start: 2019-10-04

## 2019-10-07 ENCOUNTER — TELEPHONE (OUTPATIENT)
Dept: FAMILY MEDICINE CLINIC | Facility: CLINIC | Age: 54
End: 2019-10-07

## 2019-10-07 NOTE — TELEPHONE ENCOUNTER
Pt called requesting refill.  He said he could not come in because he did not have insurance but should have it by January.  I attempted to call pt to tell him that we could no longer fill his Citalopram since he had not been seen since Dec 2017 but he did not answer and his mailbox is full.

## 2019-12-24 DIAGNOSIS — I10 ESSENTIAL HYPERTENSION: ICD-10-CM

## 2019-12-26 RX ORDER — IRBESARTAN 150 MG/1
150 TABLET ORAL NIGHTLY
Qty: 15 TABLET | Refills: 0 | Status: SHIPPED | OUTPATIENT
Start: 2019-12-26 | End: 2020-02-29 | Stop reason: SDUPTHER

## 2020-02-19 ENCOUNTER — TELEPHONE (OUTPATIENT)
Dept: FAMILY MEDICINE CLINIC | Facility: CLINIC | Age: 55
End: 2020-02-19

## 2020-02-19 DIAGNOSIS — F41.9 ANXIETY: ICD-10-CM

## 2020-02-19 RX ORDER — CITALOPRAM 40 MG/1
40 TABLET ORAL DAILY
Qty: 14 TABLET | Refills: 0 | OUTPATIENT
Start: 2020-02-19

## 2020-02-19 NOTE — TELEPHONE ENCOUNTER
Pt calling, requesting medication refill of his Celexa without being seen.   Pt hasn't been seen since 2017    A telephone encounter in October with Paola notified pt he would have to be seen before any refills would be provided, as well as many other attempts documented notifying pt that we cannot do this.    On pts third vm today, he mentioned a refill of his htn med was sent in by Stephanie in December. Pt argues the Celexa is equally important, and another exception should be made for him.

## 2020-02-29 DIAGNOSIS — I10 ESSENTIAL HYPERTENSION: ICD-10-CM

## 2020-03-02 RX ORDER — IRBESARTAN 150 MG/1
150 TABLET ORAL NIGHTLY
Qty: 15 TABLET | Refills: 0 | Status: SHIPPED | OUTPATIENT
Start: 2020-03-02 | End: 2020-03-18 | Stop reason: SDUPTHER

## 2020-03-17 DIAGNOSIS — I10 ESSENTIAL HYPERTENSION: ICD-10-CM

## 2020-03-18 DIAGNOSIS — I10 ESSENTIAL HYPERTENSION: ICD-10-CM

## 2020-03-18 RX ORDER — IRBESARTAN 150 MG/1
TABLET ORAL
Qty: 15 TABLET | Refills: 0 | OUTPATIENT
Start: 2020-03-18

## 2020-03-18 RX ORDER — IRBESARTAN 150 MG/1
150 TABLET ORAL NIGHTLY
Qty: 15 TABLET | Refills: 0 | Status: SHIPPED | OUTPATIENT
Start: 2020-03-18

## 2020-04-03 DIAGNOSIS — I10 ESSENTIAL HYPERTENSION: ICD-10-CM

## 2020-04-03 RX ORDER — IRBESARTAN 150 MG/1
TABLET ORAL
Qty: 15 TABLET | Refills: 0 | OUTPATIENT
Start: 2020-04-03